# Patient Record
(demographics unavailable — no encounter records)

---

## 2017-10-06 NOTE — MRI
MRI CERVICAL SPINE WITHOUT CONTRAST:

 

HISTORY:

Neck pain with pain radiating down both arms and hands.  Cervical radiculopathy.

 

COMPARISON:

None.

 

TECHNIQUE:

A cervical spine MRI was performed without contrast administration.  Multisequential, multiplanar im
aging was performed.

 

FINDINGS:

Appropriate T1 marrow signal intensity of the cervical vertebrae.  Cervical spine vertebral body hei
ght is maintained.  No fracture.

 

No significant STIR hyperintensity to suggest vertebral body edema or ligamentous injury.

 

The visualized brain parenchyma, cervical-medullary junction, cervical cord, and upper thoracic cord
 have normal size and signal intensity.

 

C2-C3:  No significant disk osteophyte complex.  No significant central canal stenosis.  The neural 
foramina are patent.

 

C3-C4:  No significant disk osteophyte complex.  No significant central canal stenosis.  Degenerativ
e change in the bilateral uncovertebral joints results in mild bilateral foraminal narrowing.

 

C4-C5:  Broad-based disk osteophyte complex abuts the thecal sac.  No significant central canal sten
osis.  Degenerative changes in the bilateral uncovertebral joints results in moderate right and mild
 to moderate left foraminal narrowing.

 

C5-C6:  Broad-based disk osteophyte complex abuts the thecal sac.  No high grade central canal steno
sis.  Degenerative changes of the bilateral uncovertebral joint results in moderate right and mild l
eft foraminal narrowing.

 

C6-C7:  There is a broad-based disk osteophyte complex without significant central canal stenosis.  
Degenerative changes of the right uncovertebral joint result in moderate right foraminal narrowing. 
 Mild left foraminal narrowing.

 

C7-T1:  No significant disk osteophyte complex.  No significant central canal stenosis.  The neural 
foramina are patent.

 

IMPRESSION:

Degenerative changes in the cervical spine as above.

 

POS: [facility]

## 2017-10-09 NOTE — BD
BONE DENSITOMETRY USING DEXA:

 

Date:  10/09/17 

 

HISTORY:  

Postmenopausal screening for osteoporosis. 

 

FINDINGS:

 

Lumbar Spine:       BMD (g/cm2)

    L1              0.967         T-Score:  -0.2   Z-Score:  1.8

    L2              1.245         T-Score:  2.0    Z-Score:  4.2

    L3              1.194         T-Score:  1.0    Z-Score:  3.4 

    L4              1.246         T-Score:  1.7    Z-Score:  4.1

    L1-L4           1.167         T-Score:  1.1    Z-Score:  3.4

 

Femoral Neck:       0.828         T-Score:  -0.2   Z-Score:  1.8

Total Femur:        0.894         T-Score:  -0.4   Z-Score:  1.3

 

IMPRESSION: 

Normal bone mineral density. No evidence of osteopenia/osteoporosis.   

 

POS: [facility]

## 2017-12-19 NOTE — RAD
CERVICAL SPINE THREE VIEWS:

 

HISTORY:

Surgery in November 2017.

 

COMPARISON:

MRI from 10/05/2017.

 

FINDINGS:

The patient is status post ACDF at C4-C6.  There are diskectomy changes with early osseous incorporat
ion.  No displacement of the disk cages.

 

Moderate facet arthrosis at C3-C4 and C4-C5.  No listhesis.  Moderate degenerative disk space disease
 and sclerosis with osteophyte formation at C6-C7.

 

There are surgical clips in the left neck.

 

IMPRESSION:

Satisfactory appearance of anterior cervical diskectomy and fusion at C4-C6 without hardware complica
tion.

 

POS: [facility]

## 2018-02-27 NOTE — MRI
MRI LUMBAR SPINE WITH AND WITHOUT IV CONTRAST:

 

02/27/2018

 

HISTORY:

Lumbar radiculopathy.

 

The patient states right leg and back pain for a few months.  History of back surgery in September.

 

COMPARISON:

Noncontrast MRI lumbar spine on 08/17/2017.

 

FINDINGS:

There is an increased T2-weighted signal intensity structure seen at the anterior aspect, inferior po
le, right kidney, demonstrating characteristics most consistent with a cyst.  This was also present o
n the prior MRI examination, as well as CT abdomen on 07/17/2015.

 

There is partial visualization of intrahepatic and extrahepatic biliary ductal dilatation; however, C
T abdomen on 07/17/2015 demonstrated post cholecystectomy changes with biliary ductal dilatation on t
hat exam.  Findings were likely related to reservoir effect.  There is question of a tiny filling def
ect within a dilated posterior segment right hepatic duct, which could potentially represent a tiny c
alculus within the duct, although it is difficult to adequately evaluate on this exam.

 

The conus medullaris is normal in appearance and terminates at the level of the L1 vertebral body.

 

There is a rounded focus of increased T1 and T2 weighted signal intensity present in the T11 vertebra
l body, most compatible with a hemangioma.  Endplate degenerative changes are seen at multiple levels
 with degenerative changes again seen throughout the lumbar spine.  There is right convex scoliosis o
f the lumbar spine.  Tarlov cysts are again seen, posterior to the S2 and S3 vertebral bodies.

 

Again noted is mild aneurysmal dilatation of the infrarenal abdominal aorta, which measures 3.4 cm in
 greatest transverse dimension.  This was also present on the CT exam in 2015.

 

L1-L2:  There is mild broad-based disk bulge, resulting in very slight effacement of the ventral aspe
ct of the thecal sac.  The neural foramina are patent.

 

L2-L3:  There is loss of intervertebral disk height.  A broad-based disk osteophyte complex is again 
present.  There is mild left-sided neural foraminal narrowing with only minimal encroachment of the r
ight neural foramen, which is a stable finding.

 

L3-L4:  There is loss of intervertebral disk height.  There is a broad-based disk osteophyte complex 
with moderate facet hypertrophic changes and ligamentous thickening.  There is moderate narrowing of 
the central spinal canal with mild to moderate left-sided neural foraminal narrowing and mild right-s
ided neural foraminal narrowing.

 

L4-L5:  Again noted is a broad-based disk osteophyte complex and facet hypertrophic changes.  Promine
nt endplate degenerative changes are present at this level.  There is mild left-sided neural foramina
l narrowing with moderate to severe right-sided neural foraminal narrowing, but the degree of foramin
al narrowing is similar to the prior study.

 

L5-S1:  There is loss of intervertebral disk height with endplate degenerative changes.  There is a b
road-based disk osteophyte complex and facet degenerative changes.  A small central disk protrusion i
s present.  This central disk protrusion does result in mild mass effect on the central aspect of the
 ventral portion of the thecal sac.  Moderate bilateral neural foraminal narrowing is present, greate
r on the left.

 

There is a right laminotomy defect at L5 with enhancement in this region, suggesting scar tissue seen
 posterior to the right lamina and along the spinous process on the right; however, this enhancement 
does not extend into the central canal, and this area of enhancement is most suggestive of scarring. 
 Similar enhancement is seen at the L5-S1 level, again related to scarring in the soft tissues poster
ior to the L5 vertebral body.

 

IMPRESSION:

1.  Multilevel degenerative changes, which have not progressed when compared to the prior exam in 201
7.

 

2.  Post surgical changes at the L4-L5 level, related to laminotomy defect with enhancement in the so
ft tissues posterior to the vertebral body on the right, related to scarring.

 

3.  Right convex scoliosis of the lumbar spine.

 

4.  Infrarenal abdominal aortic aneurysm, seen on prior CT exam in 2017.

 

5.  Right renal cyst.

 

POS: [facility]

## 2018-03-01 NOTE — CT
CT LUMBAR SPINE WITHOUT CONTRAST:

 

Date:  03/01/18 

 

HISTORY:  

Lumbar radiculopathy. Low back pain with fall from kitchen cabinet 2 years ago. Previous surgery. 

 

COMPARISON:  

None. 

 

CORRELATION:

Lumbar spine MRI dated 02/27/18. 

 

TECHNIQUE:  

Lumbar spine CT is performed without contrast. Sagittal and coronal reformatted images are submitted 
for interpretation. 

 

FINDINGS:

Visualized lung bases are clear. No retroperitoneal mass, lymphadenopathy, or hematoma. Previous jak
atric surgical changes are suspected. Gallbladder is surgically absent. Nonobstructing calcifications
 in the right renal pelvis. Symmetric attenuation of the psoas muscles. No retroperitoneal mass, lymp
hadenopathy, or hematoma. There is atherosclerosis of an upper normal abdominal aorta, incompletely e
valuated. 

 

Vacuum disc phenomenon at L2-L3, L4-L5, and L5-S1. 

 

3.3 mm of anterolisthesis of L3 upon L4. 

 

Lumbar spine vertebral body height is maintained. No fracture. There are degenerative changes involvi
ng the posterior elements at L3, L4, and L5. 

 

Limited evaluation of the contents of the central spinal canal and neural foramina due to technique. 


 

Please refer to recent MRI for further detail. 

 

T11-T12 and T12-L1:  

No high grade central canal stenosis or high grade foraminal narrowing. 

 

L1-L2: 

No high grade central canal stenosis or high grade foraminal narrowing. 

 

L2-L3: 

Vacuum disc phenomenon. Generalized disc bulge. Mild central canal stenosis. Right neural foramen is 
patent. Mild left foraminal narrowing. 

 

L3-L4; 

Mild loss of disc space height. Generalized disc bulge, ligamentum flavum thickening, and facet hyper
trophy result in mild central canal stenosis. Right neural foramen is patent. Mild to moderate left f
oraminal narrowing. 

 

L4-L5: 

Vacuum disc phenomenon. Generalized disc bulge. No high grade central canal stenosis. Severe right an
d mild left foraminal narrowing. 

 

L5-S1: 

Disc desiccation with severe loss of disc space height. Generalized disc bulge without high grade macrina
tral canal stenosis. Moderate bilateral foraminal narrowing. 

 

There is hypodensity in the central aspect of the S1 level, compatible with a prominent thecal sac. 

 

IMPRESSION: 

Degenerative changes of the lumbar spine as above. Please refer to lumbar spine MRI for further detai
l. 

 

 

 

POS: [facility]

## 2018-04-13 NOTE — RAD
PORTABLE CHEST 1 VIEW:

 

DATE: 4/13/18.

TIME: 2:03 p.m.

 

HISTORY: 

Infiltrate on CT.

 

FINDINGS: 

The heart size is normal.  The lungs are expanded.  There are patchy infiltrates in the right mid and
 lower lung fields.  No pneumothoraces or pleural effusions are seen.

 

IMPRESSION: 

Findings are suspicious for right-sided pneumonia.

 

[facility]: [facility]

## 2018-04-13 NOTE — CT
CT OF HEAD NONCONTRAST:

 

Clinical history: Left sided facial droop and slurred speech. 

 

Comparison: 10-12-07 head CT 

 

FINDINGS: 

There is a focal region of encephalomalacia localizing to the posterior division of the left MCA terr
itory with surrounding white matter hypointensity indicating additional gliosis. No evidence of intra
cranial hemorrhage or mass effect or midline shift. Ventricular system is within normal limits of siz
e. 

 

IMPRESSION: 

1. Encephalomalacia of the left cerebral hemisphere. 

2. No acute intracranial lesion or mass effect. 

3. Telephone call with findings placed to ER physician, Dr. Dick, at 1313 hours 4-13-18. Code CR

 

POS: [facility]

## 2018-04-13 NOTE — PDOC.FPRHP
- History of Present Illness


Chief Complaint: Severe chills, Slurred Speech, Dizzy, Weak


History of Present Illness: 





This is a 74 yo female who comes in with complaints of waking up and having 

nonunderstandable speech, severe chills, dizziness, and weakness. Sister 

reports her looking more tired yesterday. Pt reports having cough for last 

month. Says had productive clear sputum. Reports that last few days having 

increased SOB with exertion. Did not think much of it, thought it was sinuses. 

Pt has pmh of Stroke in 2007 in which she has some R. sided defecits. Family 

says she does get slurred speech when she gets tired. yet, they stated the 

speech change today was much worse and different. Pt also has hx of CEA of left 

carotid and having stenosis in R. carotid but no severe enough for surgery. She 

denied any fevers. Pt reports having n/v/d/c but that it is chronic and related 

to multiple abdominal surgeries and chron's dz. Denied any chest pain. Denied 

any nasal congestion. Reports being week. 


ED Course: 





2 boluses fluid





- Allergies/Adverse Reactions


 Allergies











Allergy/AdvReac Type Severity Reaction Status Date / Time


 


metoclopramide [From Reglan] Allergy Intermediate "MAKES HER Verified 09/11/17 

14:25





   HYPER"  


 


metronidazole [From Flagyl] Allergy Intermediate DIZZY, Verified 09/11/17 14:25





   LIGHTHEADED  


 


Penicillins Allergy Intermediate Rash Verified 09/11/17 14:25


 


pregabalin [From Lyrica] Allergy Intermediate MOUTH Verified 09/11/17 14:25





   SWELLING  














- Home Medications


 











 Medication  Instructions  Recorded  Confirmed  Type


 


Aspirin [Aspir-Low] 81 mg PO DAILY 09/11/17 04/13/18 History


 


Calcium Carbonate [Calcium] 1,500 mg PO DAILY 09/11/17 04/13/18 History


 


Dicyclomine [Bentyl] 20 mg PO QID 09/11/17 04/13/18 History


 


Diphenoxylate HCl/Atropine 2 tab PO QID PRN 09/11/17 04/13/18 History





[Lomotil]    


 


Estradiol 1 mg PO DAILY 09/11/17 04/13/18 History


 


Hyoscyamine Sulfate 0.125 mg PO Q4H PRN 09/11/17 04/13/18 History


 


Ipratropium 0.06% Nasal Inhale 2 spray INH TID 09/11/17 04/13/18 History





[Atrovent 0.06% Nasal Spray]    


 


Levothyroxine Sodium 50 mcg PO DAILY 09/11/17 04/13/18 History


 


Loperamide HCl [Imodium A-D] 2 mg PO PRN PRN 09/11/17 04/13/18 History


 


Metoprolol Tartrate [Metoprolol 12.5 mg PO BID 09/11/17 04/13/18 History





Tartrate]    


 


Mirtazapine [Remeron] 15 mg PO HS 09/11/17 04/13/18 History


 


Multivitamin [Daily Multiple 1 each PO DAILY 09/11/17 04/13/18 History





Vitamin]    


 


Omeprazole [Omeprazole] 40 mg PO DAILY 09/11/17 04/13/18 History


 


Ramipril [Altace] 5 mg PO HS 09/11/17 04/13/18 History


 


Rosuvastatin Calcium [Rosuvastatin 20 mg PO HS 09/11/17 04/13/18 History





Calcium]    


 


Sertraline HCl [Zoloft] 100 mg PO DAILY 09/11/17 04/13/18 History


 


Tramadol HCl [Tramadol HCl] 50 mg PO BID PRN 09/11/17 04/13/18 History


 


azaTHIOprine [Azathioprine] 50 mg PO DAILY 09/11/17 04/13/18 History














- History


PMHx:Chron's Dz, Aneurysm in Stomach, HA 2004, Stroke 2007 w/ R. sided weakness

, Macrocytic Anemia of unknown cause, Depression


 


PSHx: 1/3 stomach removed (90s), Large Bowel Resection 2011 due to adhesions, 

CEA of Left carotid, Back Surgery 2017, Neck Surgery 2017, Cholecystectomy, 

Hysterectomy, 





FHx: Insignificant


 


Social: Smoked 50+ years, quit 6 yrs ago. No alcohol, No illicit drug use


 








- Review of Systems


General: reports: fever/chills, fatigue.  denies: weight/appetite/sleep changes

, night sweats


Eyes: denies: eye pain, vision changes, other


ENT: denies: nasal congestion, rhinorrhea, other


Respiratory: reports: cough, shortness of breath.  denies: exercise intolerance

, other


Cardiovascular: denies: chest pain, palpitation, edema, paroxysmal nocturnal 

dyspnea, orthopnea


Gastrointestinal: reports: nausea, vomiting, diarrhea, constipation, abdominal 

pain.  denies: GI bleeding


Genitourinary: denies: incontinence, dysuria, polyuria, discharge


Skin: denies: rashes, lesions, jaundice, itching


Musculoskeletal: denies: pain, tenderness, stiffness, swelling, arthritis/

arthralgias, other


Neurological: denies: numbness, syncope, seizure, weakness, other


Psychological: reports: depression.  denies: anxiety, other





- Vital signs


T 97.7, P 80, RR 17, O2 96% on 2L. BP 92/50





- Physical Exam


Constitutional: NAD, awake, alert and oriented, well developed


HEENT: normocephalic and atraumatic, PERRLA, EOMI, conjunctiva clear, MMM


Neck: supple, trachea midline, no LAD, no JVD, no thyromegaly, no bruits


Chest: no-tender to palpation, no lesions


Heart: RRR, normal S1/S2, no murmurs/rubs/gallops, pulses present, no edema


-Lungs: 





Mild wheezing noted. Decreased breath sounds noted on R. side. R. side with 

crackles


Abdomen: soft, non-tender, bowel sounds present, no masses/distention


Neurological: normal sensation


-Neurological: 





CN 7 affected. L. side facial droop noted. Some slow speech


Skin: no rash/lesions, good turgor, capillary refill <2 seconds


Heme/Lymphatic: no unusual bruising or bleeding


Psychiatric: normal mood and affect, good judgment and insight, intact recent 

and remote memory





[facility] H&P: Results





- Labs


Result Diagrams: 


 04/13/18 13:36





 04/13/18 13:36


Lab results: 


 











WBC  14.8 thou/uL (4.8-10.8)  H  04/13/18  13:36    


 


Hgb  10.1 g/dL (12.0-16.0)  L  04/13/18  13:36    


 


Hct  31.4 % (36.0-47.0)  L  04/13/18  13:36    


 


MCV  106.0 fl (81.0-99.0)  H  04/13/18  13:36    


 


Plt Count  181 thou/uL (130-400)   04/13/18  13:36    


 


Neutrophils %  90.0 % (42.0-75.0)  H  04/13/18  13:36    


 


Sodium  137 mmol/L (136-145)   04/13/18  13:36    


 


Potassium  3.3 mmol/L (3.5-5.1)  L  04/13/18  13:36    


 


Chloride  107 mmol/L ()   04/13/18  13:36    


 


Carbon Dioxide  21 mmol/L (23-31)  L  04/13/18  13:36    


 


BUN  16 mg/dL (9.8-20.1)   04/13/18  13:36    


 


Creatinine  1.21 mg/dL (0.6-1.1)  H  04/13/18  13:36    


 


Glucose  172 mg/dL ()  H  04/13/18  13:36    


 


Lactic Acid  3.1 mmol/L (0.5-2.2)  H  04/13/18  13:36    


 


Calcium  8.4 mg/dL (7.8-10.44)   04/13/18  13:36    


 


Total Bilirubin  0.3 mg/dL (0.2-1.2)   04/13/18  13:36    


 


AST  18 U/L (5-34)   04/13/18  13:36    


 


ALT  7 U/L (8-55)  L  04/13/18  13:36    


 


Alkaline Phosphatase  70 U/L ()   04/13/18  13:36    


 


Creatine Kinase  44 U/L ()   04/13/18  13:36    


 


CK-MB (CK-2)  0.6 ng/mL (0-6.6)   04/13/18  13:36    


 


Serum Total Protein  5.3 g/dL (6.0-8.3)  L  04/13/18  13:36    


 


Albumin  3.0 g/dL (3.4-4.8)  L  04/13/18  13:36    














- Radiology Interpretation


  ** Chest x-ray


Status: image reviewed by me, report reviewed by me (cxray concerning for r. 

side pna)





  ** CT scan - head


Status: image reviewed by me, report reviewed by me (CT head neg, old stroke 

findings. CTA head neck shows no acute blockage or change)





[facility] H&P: A/P





- Problem List


(1) Sepsis


Current Visit: Yes   Status: Acute   Code(s): A41.9 - SEPSIS, UNSPECIFIED 

ORGANISM   





(2) Community acquired pneumonia


Current Visit: Yes   Status: Acute   Code(s): J18.9 - PNEUMONIA, UNSPECIFIED 

ORGANISM   





(3) History of tobacco use


Current Visit: Yes   Status: Acute   Code(s): Z87.891 - PERSONAL HISTORY OF 

NICOTINE DEPENDENCE   





(4) Acute Crohn's disease


Current Visit: Yes   Status: Acute   Code(s): K50.90 - CROHN'S DISEASE, 

UNSPECIFIED, WITHOUT COMPLICATIONS   





(5) History of CVA with residual deficit


Current Visit: Yes   Status: Acute   Code(s): I69.30 - UNSPECIFIED SEQUELAE OF 

CEREBRAL INFARCTION   





(6) Macrocytic anemia


Current Visit: Yes   Status: Acute   Code(s): D53.9 - NUTRITIONAL ANEMIA, 

UNSPECIFIED   





- Plan





1. sepsis 2/2 CAP-no hx of recent hospitalization, coming from home, 


Azithro and Rocephin. Urine Strep/Legionella pending. will adjust tx accordinlgy


Received 2 boluses in ER. NS@150 ml/hr. Will continue to monitor HR 


Will follow lactic acid for sepsis


CBC am


-Cxray shows concern for R. side infectious process








2. Possible CVA vs. TIA-L. sided facial droop with speech defecit reported by 

family


 Will obtain MRI in AM given high risk with previous known CAD and hx of CVA. 


-consult neurology


-ON ASA and statin- continue


 for now allow for permissive htn to 220/120. 


 check FLP, A1c, TSH





3. Chrons-not in exac, will continue home meds





4. Hypothyroidism-continue home synthroid





5. Depression-continue home meds





6. CAD-continue statin, asa





7. Hld-continue statin





8. Htn- hold for now given above





[facility] H&P: Upper Level





- Pertinent history





Pt is a pleasant 74 yo F w/PMH of Chron's, Hx of MI, CVA who presents to the ED 

with 1 mo hx of cough and 1 day hx of L sided facial droop. Here is ED with 

sister and niece, they report that she has been very weak and hx of CVA with R 

sided deficit but noticed L facial droop and were worried about a stroke so 

brought her in. Reports wet cough that has been worsening, subjective chills, 

Tmax of 101 in ED. Denies PND, orthopnea, reports some diffuse mild abdominal 

pain on exam but non-tender. CXR in ED shows R sided consolidation. Brain CT 

and CTA head/neck negative. Hx of L carotid endarterectomy. 





- Pertinent findings


 Tmax: 101


General: Appears in no distress, tired, coughing, appropriate hygiene and 

approx stated age


Neck: L>R bruit, but no palpable thrill


Cardiac: RRR


Lungs: R base-rales, rhonchi


Abdomen: soft, nontender, good bs


Extremities: no cyanosis, clubbing, edema


Neuro: CN II-XII intact, no cerebellar findings with testing





- Plan


Date/Time: 04/13/18 1556








I, Sarahdesmond Menendez, have evaluated this patient and agree with findings/plan as 

outlined by intern resident. Pertinent changes/additions are listed here.





Pertinent A/P:


1. sepsis 2/2 CAP-no hx of recent hospitalization, coming from home, will 

continue treatment from ED of sarah and rocephin, will administer fluids for 

sepsis and recheck lactate in 3-4 hrs. Morning labs to recheck WBC





2. Possible CVA vs. TIA-do not appreciate any abnormalities on neuro exam-

questionable L bottom labial droop. Will obtain MRI in AM given high risk with 

previous known CAD and hx of CVA. Will consult neuro if CVA is evident, for now 

allow for permissive htn to 220/120. Risk stratify check FLP, A1c, TSH





3. Chrons-not in exac, will continue home meds





4. Hypothyroidism-continue home synthroid





5. Depression-continue home meds





6. CAD-continue statin, asa





7. Hld-continue statin





8. Htn- hold for now given above

## 2018-04-13 NOTE — CT
CTA HEAD WITH 3D VOLUME RENDERING

CTA NECK WITH 3D VOLUME RENDERING:

 

CLINICAL HISTORY: 

Stroke, left side facial droop, slurred speech.

 

FINDINGS: 

CT imaging of the head and neck performed.  There is atherosclerosis at the imaged aortic arch and in
volving the origins of the great vessels.  No high-grade stenosis or occlusion of the great vessel or
igins is seen.  There is mild atherosclerotic stenosis of the proximal aspect of the left subclavian 
artery by soft plaque.  The bilateral vertebral arteries reveal no high-grade stenosis or occlusion w
ith convergence into a patent basilar artery.  There is mild calcification at the origin of the right
 common carotid artery and there is calcified and noncalcified plaque suggesting mild stenosis at the
 distal right common carotid artery.  The origin of the left common carotid artery is not visualized.
  The imaged left common carotid artery reveals no high-grade stenosis or occlusion.  Evaluation of e
ach cervical ICA reveals no significant stenosis or occlusion.  There is atherosclerosis/plaque forma
tion at each carotid bulb.  There is calcification at each carotid siphon.  Correlate for history of 
prior left carotid endarterectomy. The bilateral M1 segments are patent.  Relative symmetric contrast
 opacification of distal MCA branches is seen bilaterally.  There is no high-grade stenosis of the bi
lateral ANDRES or PCA.  

 

Incidental note of multifocal patchy opacification of the right upper lung zone indicating infectious
/inflammatory process, although incompletely evaluated.  Correlate clinically.  Incidental note of an
terior metallic fusion of the cervical spine.

 

IMPRESSION: 

1.  No high-grade focal stenosis or thrombotic occlusion of the major arterial system head and neck. 
 Scattered atherosclerosis is present.

 

2.  Incidental note of multifocal patchy consolidation of the imaged upper right lung zone favoring i
nfectious/inflammatory process.  Correlate clinically.

 

POS: [facility]

## 2018-04-14 NOTE — MRI
NONCONTRAST ENHANCED MRI IMAGES OF BRAIN:

 

HISTORY: 

Stroke.

 

FINDINGS:

Noncontrast-enhanced MRI images of the brain obtained.

 

Images demonstrate encephalomalacia and gliotic changes seen in the left parietal lobe which appears 
to be old. 

 

No evidence of areas of diffusion restriction seen to suggest acute strokes.

 

Normal flow voids seen in the major intracranial vessels.  No significant evidence of intracranial pa
thology noted otherwise.

 

IMPRESSION: 

Old left parietal area of stroke with encephalomalacia and gliotic changes.  No acute intracranial pa
thology is seen. 

 

POS: [facility]

## 2018-04-14 NOTE — PDOC.FM
- Subjective


Subjective: 





Kerrie Morrissey seen at bedside this morning. She states that she is doing much 

better today compared to yesterday.  States that she felt terrible yesterday 

morning with chills and weakness. States that her slurred speech has improved 

and it is common for her to get slurred speech when she is weak or ill.  She 

denies any acute events overnight, denies fever, chills, chest pain, dyspnea, n/

v.





- Objective


MAR Reviewed: Yes


Vital Signs & Weight: 


 Vital Signs (12 hours)











  Temp Pulse Resp BP Pulse Ox


 


 04/14/18 03:44  97.6 F  76  16  99/64  91 L


 


 04/13/18 23:37  97.5 F L  72  16  112/56 L  94 L


 


 04/13/18 21:26      97


 


 04/13/18 21:18  97.5 F L  72  16   97


 


 04/13/18 19:18  97.6 F  74  18  99/43 L  89 L








 Weight











Weight                         55.61 kg














I&O: 


 











 04/12/18 04/13/18 04/14/18





 06:59 06:59 06:59


 


Intake Total   2697


 


Output Total   400


 


Balance   2297











Result Diagrams: 


 04/14/18 04:36





 04/14/18 04:36





<Juanjo Frazier - Last Filed: 04/14/18 07:53>





- Objective


Vital Signs & Weight: 


 Vital Signs (12 hours)











  Temp Pulse Pulse Pulse Resp BP BP


 


 04/14/18 16:00  98.5 F  62    14  


 


 04/14/18 11:30  98.0 F  79    20  


 


 04/14/18 10:46    84  83   124/65  139/75


 


 04/14/18 08:31  98.4 F  75    16  


 


 04/14/18 07:26  98.4 F  75    16  














  BP Pulse Ox


 


 04/14/18 16:00  137/74  95


 


 04/14/18 11:30  123/59 L  93 L


 


 04/14/18 10:46  


 


 04/14/18 08:31   90 L


 


 04/14/18 07:26  122/61  90 L








 Weight











Admit Weight                   54.068 kg


 


Weight                         55.61 kg














I&O: 


 











 04/13/18 04/14/18 04/15/18





 06:59 06:59 06:59


 


Intake Total  2697 


 


Output Total  400 


 


Balance  2297 











Result Diagrams: 


 04/14/18 04:36





 04/14/18 04:36





<Mitul Montgomery - Last Filed: 04/14/18 16:59>





Phys Exam





- Physical Examination


Constitutional: NAD


HEENT: moist MMs, sclera anicteric


Neck: no nodes, supple, full ROM


Respiratory: no wheezing, no rales, no rhonchi, clear to auscultation bilateral


Cardiovascular: RRR, no significant murmur


Gastrointestinal: soft, non-tender, no distention


Musculoskeletal: no edema, pulses present


Neurological: non-focal, normal sensation, moves all 4 limbs


Psychiatric: normal affect, A&O x 3


Skin: cap refill <2 seconds





<Juanjo Frazier - Last Filed: 04/14/18 07:53>





Dx/Plan


(1) Sepsis


Code(s): A41.9 - SEPSIS, UNSPECIFIED ORGANISM   Status: Resolved   





(2) Community acquired pneumonia


Code(s): J18.9 - PNEUMONIA, UNSPECIFIED ORGANISM   Status: Acute   


  QualifierTitle:    Laterality: right 





(3) History of CVA with residual deficit


Code(s): I69.30 - UNSPECIFIED SEQUELAE OF CEREBRAL INFARCTION   Status: Acute   





(4) History of Crohn's disease


Code(s): Z87.19 - PERSONAL HISTORY OF OTHER DISEASES OF THE DIGESTIVE SYSTEM   

Status: Acute   





- Plan


Plan: 





1. sepsis 2/2 CAP-no hx of recent hospitalization, coming from home


- Azithro and Rocephin. Urine Strep/Legionella were negative.


- Received 2 boluses in ER. NS@150 ml/hr. Will continue to monitor HR 


- Lactic Acid downtrending 4.3 to 2.5


- WBC rising to 17.9 this morning with 85% PMNs


- Cxray shows concern for R. side infectious process








2. Possible CVA vs. TIA-L. sided facial droop with speech defecit reported by 

family


- Will obtain MRI in AM given high risk with previous known CAD and hx of CVA. 


- consult neurology if MRI positive


- ON ASA and statin- continue


- for now allow for permissive htn to 220/120. 





3. Chrons-not in exac, will continue home meds





4. Hypothyroidism-continue home synthroid





5. Depression-continue home meds





6. CAD-continue statin, asa





7. Hld-continue statin





8. Htn- hold for now given above








<Juanjo Frazier - Last Filed: 04/14/18 07:53>





Attending Addendum





- Attending Addendum


Date/Time: 04/14/18 1612





I personally evaluated the patient and discussed the management with Dr. Frazier.


I agree with the History, Examination, Assessment and Plan documented above 

with any addition or exceptions noted below.


Ms. Morrissey is a 72 y/o female with a PMHx of Left MCA stroke who was admitted 

for malaise and fatigue with increased right sided weakness, facial droop and 

slurred speech. She was found to have a RLL community acquired pneumonia and 

Dr. Sánchez (neurology) evaluated her today and he believes her neuro sx's are 

result of toxic-metabolic encephalpathy due to sepsis from her CAP.  She feels 

much better this a.m. and believes from a neuro standpoint she is back at 

baseline. 


Exam:  T97.6, P76, R16, BP 99/64,  SaO2 91, Wt 55.6 kg


HEENT: WNL. mm moist. no appreciable droop to this examiner. 


Neck: supple no adenopathy. 


Lungs; CTA with decreased breath sounds R posterior base. 


Abd: Neg.


Ext: No C/E/C/pallor.


MRI Brain shows only prior stroke with encephalomalacia. No acute changes.


A:  Sepsis due to CAP improved. Toxic encephalopathy resolved.  


P: Continue Rocephin and Azithromycin.  Possibly home tomorrow if continued 

improvement.  


MD Nii





<Mitul Montgomery - Last Filed: 04/14/18 16:59>

## 2018-04-14 NOTE — CON
DATE OF CONSULTATION:  04/14/2018

 

REFERRING PROVIDER:  Domingo Brown M.D.

 

REASON FOR CONSULTATION:  Worsening right-sided weakness.

 

HISTORY OF PRESENT ILLNESS:  Ms. Morrissey is a pleasant 73-year-old  female who has been cons
ulted for evaluation of worsening right-sided weakness.  Patient reports that over the past few days,
 she has been feeling more fatigued and tired.  She has noted increasing episodes of weakness all ove
r, but more so on the right side than the left.  She has also had some chills and chest congestion.  
She notes that yesterday she had worsening weakness in her right side.  She was also having increasin
g difficulty with getting her words out and expressing herself.  This prompted her to present to the 
[facility].  She does report that she has a history of stroke in 2007, which resulted 
in right-sided weakness and aphasia.  She had recovered from her stroke and has been doing well up un
til yesterday.  She mentions that she has been having increasing episodes of pain in her right lower 
extremity when she is walking or lying down.  She also has had numbness that tends to come on when sh
e sits for short duration.  She has had multiple falls as she is not able to control her right leg at
 times.  She has had cervical and lumbar spine surgery done.  Her lumbar spine surgery was done in 09
/2017 and then followed by a cervical spine surgery in 11/2017.  As she continued to have these episo
senia of numbness and pain along with weakness and incoordination, she was supposed to be seen by me in
 clinic in May.

 

PAST MEDICAL HISTORY:  Significant for hypertension, history of stroke with left MCA stroke, macrocyt
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

 

PAST SURGICAL HISTORY:  Significant for intestinal surgery, large bowel resection in 2011 due to adhe
sions, left carotid endarterectomy, lumbar spine surgery in 09/2017, cervical spine surgery in 11/201
7, cholecystectomy, and hysterectomy.

 

FAMILY HISTORY:  None significant.

 

SOCIAL HISTORY:  She is a smoker of 50+ years, but quit 6 years ago.  She denies alcohol use or illic
it drug use.

 

CURRENT MEDICATIONS:  Please review MAR.

 

ALLERGIES:  Include METOCLOPRAMIDE, METRONIDAZOLE, PENICILLIN, PREGABALIN.

 

REVIEW OF SYSTEMS:  As mentioned above in the HPI, otherwise negative.

 

PHYSICAL EXAMINATION:

VITAL SIGNS:  Blood pressure of 123/59, pulse of 79, temperature of 98, respirations are 20, O2 sats 
of 93% on room air.

GENERAL:  Well-developed and well-nourished  female in no apparent distress.

RESPIRATORY:  Clear to auscultation bilaterally.

CARDIOVASCULAR:  Regular rate and rhythm.

NEUROLOGIC:  Mental status:  The patient is awake, alert, and oriented x3.  Speech and language:  Flu
ent speech.  Cranial nerves:  Pupils are 3 mm and reactive.  Visual fields are intact.  External musc
les are intact.  No nystagmus is noted.  Face is symmetric.  Tongue and uvula are midline.  Motor exa
m showed normal tone and bulk with 5/5 strength in both upper and lower extremities.  Babinski:  Plan
tar responses flexion bilaterally.  Sensory:  Diminished sensation in both lower extremities in dista
l proximal gradient.  Deep tendon reflexes; 2+ reflexes in both upper extremities and 1+ reflex in philip
th the lower extremities.  Romberg is positive.  Gait is somewhat wide based gait.

 

LABORATORY DATA:  Reviewed, which included CBC, coag panel, CMP, lipid profile, vitamin B12, TSH, CK,
 CK-MB, CPK, troponin which is significant for white cell count of 17.9, hemoglobin 9.3, hematocrit 2
7.9, MCV of 105, PT of 15.8, INR 1.2, PTT of 25.0, otherwise unremarkable.

 

IMAGING STUDIES:  MRI brain without contrast was reviewed, which showed no acute intracranial abnorma
lity.  CT angiogram of the head and neck were reviewed, which showed no acute intracranial or extracr
anial vascular abnormality.  Chest x-ray results were reviewed, which showed right-sided pneumonia.

 

IMPRESSION:

1.  Right lower lobe pneumonia.

2.  Worsening right-sided weakness and aphasia, likely metabolic encephalopathy secondary to #1.

3.  Gait imbalance.

4.  Right lower extremity numbness and weakness resulting in gait abnormality.

 

Ms. Morrissey is a 73-year-old -American female who presented with the acute worsening of right-
sided weakness and aphasia.  She was found to have right lower lobe pneumonia.  Her MRI brain has bee
n negative for acute intracranial pathology.  Her symptoms are likely secondary to toxic metabolic ef
fect from pneumonia.  She continued on current medical management.  She has noted right lower extremi
ty weakness and numbness and difficulty with gait imbalance with frequent falls that is of unknown et
iology.  I will see her in my clinic for EMG nerve conduction study of right lower extremity for furt
her evaluation.  At this time, no further neurological workup needed from my standpoint.

 

Thank you for consultation.

## 2018-04-15 NOTE — PDOC.FM
- Subjective


Subjective: 





Kerrie Morrissey seen at bedside this morning. Doing well, patient asked if she 

could go home today stating she feels well enough to be at home.  She denies 

any chest pain, dyspnea, fever, chills, n/v. 





- Objective


MAR Reviewed: Yes


Vital Signs & Weight: 


 Vital Signs (12 hours)











  Temp Pulse Resp BP Pulse Ox


 


 04/15/18 03:43  99.0 F  114 H  16  155/77 H  93 L


 


 04/14/18 23:57  98.9 F  102 H  16  159/89 H  96


 


 04/14/18 20:20  98.9 F  102 H  16   94 L


 


 04/14/18 19:20  97.9 F  85  16  143/62 H  94 L








 Weight











Admit Weight                   54.068 kg


 


Weight                         55.61 kg














I&O: 


 











 04/13/18 04/14/18 04/15/18





 06:59 06:59 06:59


 


Intake Total  2697 


 


Output Total  400 


 


Balance  2297 











Result Diagrams: 


 04/15/18 04:19





 04/15/18 04:19





<Juanjo Frazier - Last Filed: 04/15/18 06:52>





- Objective


Vital Signs & Weight: 


 Vital Signs (12 hours)











  Temp Pulse Resp BP Pulse Ox


 


 04/15/18 08:38  99.5 F  97  18  


 


 04/15/18 07:59  99.5 F  97  18  141/74 H  96


 


 04/15/18 03:43  99.0 F  114 H  16  155/77 H  93 L








 Weight











Admit Weight                   54.068 kg


 


Weight                         55.61 kg














I&O: 


 











 04/14/18 04/15/18 04/16/18





 06:59 06:59 06:59


 


Intake Total 2697 480 


 


Output Total 400  


 


Balance 2297 480 











Result Diagrams: 


 04/15/18 04:19





 04/15/18 04:19





<Mitul Montgomery - Last Filed: 04/15/18 14:03>





Phys Exam





- Physical Examination


Constitutional: NAD


HEENT: moist MMs, sclera anicteric


Neck: no JVD, supple, full ROM


Respiratory: no wheezing, no rales, no rhonchi, clear to auscultation bilateral


Cardiovascular: RRR, no significant murmur


Gastrointestinal: soft, non-tender, no distention, positive bowel sounds


Musculoskeletal: no edema, pulses present


Neurological: non-focal, normal sensation, moves all 4 limbs


Psychiatric: normal affect, A&O x 3


Skin: normal turgor, cap refill <2 seconds





<Juanjo Frazier - Last Filed: 04/15/18 06:52>





Dx/Plan


(1) Sepsis


Code(s): A41.9 - SEPSIS, UNSPECIFIED ORGANISM   Status: Resolved   





(2) Community acquired pneumonia


Code(s): J18.9 - PNEUMONIA, UNSPECIFIED ORGANISM   Status: Acute   


  QualifierTitle:    Laterality: right 





(3) History of CVA with residual deficit


Code(s): I69.30 - UNSPECIFIED SEQUELAE OF CEREBRAL INFARCTION   Status: Acute   





(4) History of Crohn's disease


Code(s): Z87.19 - PERSONAL HISTORY OF OTHER DISEASES OF THE DIGESTIVE SYSTEM   

Status: Acute   





- Plan


Plan: 





1. sepsis 2/2 CAP-no hx of recent hospitalization, coming from home


- Azithro and Rocephin. Urine Strep/Legionella were negative.


- Received 2 boluses in ER. NS@150 ml/hr. Will continue to monitor HR 


- Lactic Acidosis resolved: 4.3 to 2.5 to 0.6


- WBC downtrending today


- Cxray shows concern for R. side infectious process


- Clinically, is improving, continue current management








2. Metabolic Encephalopathy-likely from #1


- MRI of brain negative for acute processes 


- Neurology consulted, appreciate recs


- Neurology wants patient to f/u OP for EMG nerve conduction study


- ON ASA and statin- continue 





3. Chrons-not in exac, will continue home meds





4. Hypothyroidism-continue home synthroid





5. Depression-continue home meds





6. CAD-continue statin, asa





7. Hld-continue statin





8. Htn- hold for now given above





<Juanjo Frazier - Last Filed: 04/15/18 06:52>





Attending Addendum





- Attending Addendum


Date/Time: 04/15/18 9698





I personally evaluated the patient and discussed the management with Dr. Frazier.


I agree with the History, Examination, Assessment and Plan documented above 

with any addition or exceptions noted below.


She feels well and is asymptomatic x for very slight non-productive cough.  

Lactic acidosis is resolved to 0.6. 


A:  Community acquired pneumonia, likely due to streptococcus pneumonia.


     Toxic/Metabolic encephalopathy now resolved likely due to sepsis from CAP, 

above. 


     R-LE weakness of unknown etiology.  


P:  D/C home to complete course of Azithromycin & Cefdinir. 


     Follow up with PCP, Dr. Casas, and with Dr. Sánchez for neurology further 

evaluation with EMG/NCV.


     MD Nii 





<Mitul Montgomery - Last Filed: 04/15/18 14:03>

## 2018-04-15 NOTE — DIS-2
DATE OF ADMISSION:  2018

 

DATE OF DISCHARGE:  04/15/2018

 

RESIDENT:  Juanjo Frazier M.D.

 

ADMITTING ATTENDING:  Nohemy Rodriguez M.D.

 

DISCHARGE ATTENDING:  Mitul Montgomery M.D.

 

CONSULTATIONS:

1.  Neurology, Dr. Sánchez, on 2018.

2.  Stroke Team, on 2018.

 

PRIMARY DIAGNOSIS:  Sepsis secondary to community-acquired pneumonia.

 

SECONDARY DIAGNOSES:

1.  Metabolic encephalopathy, likely secondary to sepsis from community-acquired pneumonia.

2.  Crohn disease.

3.  History of cerebrovascular accident.

4.  Hypothyroidism.

5.  Coronary artery disease.

6.  Hyperlipidemia.

7.  Hypertension.

8.  History of tobacco abuse.

 

DISCHARGE MEDICATIONS:  Resume home meds includin.  Ramipril 5 mg p.o. at bedtime.

2.  Levothyroxine 50 mcg p.o. daily.

3.  Aspirin 81 mg p.o. daily.

4.  Rosuvastatin 20 mg p.o. at bedtime.

5.  Metoprolol tartrate 12.5 mg p.o. b.i.d.

6.  Multivitamin 1 each day.

7.  Calcium carbonate 1500 mg p.o. daily.

8.  Mirtazapine 15 mg p.o. at bedtime.

9.  Hyoscyamine sulfate 0.125 mg p.o. q.4 hours p.r.n.

10.  Lomotil 2 tabs p.o. q.i.d. p.r.n.

11.  Bentyl 20 mg p.o. q.i.d.

12.  Zoloft 100 mg p.o. daily.

13.  Estradiol 1 mg p.o. daily.

14.  Loperamide HCL 2 mg p.o. p.r.n.

15.  Ipratropium 2 sprays INH t.i.d.

16.  Azathioprine 50 mg p.o. daily.

17.  Tramadol 50 mg p.o. b.i.d. p.r.n.

18.  Omeprazole 40 mg p.o. daily.

19.  Gabapentin 300 mg p.o. daily.

 

New home medications:

1.  Azithromycin 250 mg p.o. daily for 3 days.

2.  Cefdinir 300 mg p.o. q.12 hours.

 

HISTORY OF PRESENT ILLNESS AND HOSPITAL COURSE:  Ms. Kerrie Morrissey is a 73-year-old female with pas
t medical history of CVA with right-sided deficits, hypertension, hyperlipidemia, and history of CEA 
and the left carotid, who presented with a history of severe chills, slurred speech, dizziness and we
akness.  The patient states that when she woke up, the family was having difficulty understanding her
 and sister reports that she looked more tired than yesterday and her speech was more slurred than no
rmal.  The patient endorsed cough for the last month with productive clear sputum and states that for
 the last few days she has had increased shortness of breath with exertion.  Family says that she mason
s get slurred speech when she gets tired, but the speech change today was much worse than normal.  CT
 of the head at the time of admission showed old stroke findings, but no acute findings and no acute 
blockages in the neck.  CTA of the head and neck showed no high-grade focal stenosis or thrombotic oc
clusion of the major arterial system.  It did show incidental multifocal patchy consolidation of the 
right upper lung favoring infection.  Chest x-ray was done that showed right-sided pneumonia.  During
 her admission, she also had a brain MRI that showed old left parietal area of stroke with encephalom
alacia and gliotic changes.  No acute intracranial pathology seen.  During the patient's admission wi
thin the first 24 hours, the patient's symptoms improved dramatically.  On 2018, the patient st
ates that she was feeling much better and not having the fevers, chills, and weakness that she was ex
periencing before.  Her speech also cleared up and she was no longer slurring her speech at that time
.  Neurology was consulted; saw the patient and Dr. Sánchez had a higher suspicion for worsening right-s
ided weakness and aphasia, likely being secondary to the metabolic encephalopathy from the right lowe
r lobe pneumonia.  He also recommended her following up outpatient in the Neurology Clinic for EMG ne
rve conduction study of the right lower extremity for further evaluation.  He cleared her from a neur
ological standpoint at that time.  The patient was cleared from a medical standpoint on 04/15/2018.  
She was no longer requiring supplemental O2 and she was feeling much better and ready to complete the
 rest of her treatment outpatient.  The patient was in agreement with the plan.

 

DISPOSITION:  Stable.

 

DISCHARGE INSTRUCTIONS:

1.  Location:  Home.

2.  Diet:  Heart healthy.

3.  Activity:  As tolerated.

4.  Followup:  Follow up with Dr. Sánchez for EMG nerve conduction study and follow up with primary care
 provider for a routine hospital admission followup.

 

The patient is expecting to have complete recovery if she continues a course of antibiotics.

## 2018-04-17 NOTE — CT
CTA HEAD WITH 3D VOLUME RENDERING

CTA NECK WITH 3D VOLUME RENDERING:

 

CLINICAL HISTORY: 

Stroke, left side facial droop, slurred speech.

 

FINDINGS: 

CT imaging of the head and neck performed.  There is atherosclerosis at the imaged aortic arch and in
volving the origins of the great vessels.  No high-grade stenosis or occlusion of the great vessel or
igins is seen.  There is mild atherosclerotic stenosis of the proximal aspect of the left subclavian 
artery by soft plaque.  The bilateral vertebral arteries reveal no high-grade stenosis or occlusion w
ith convergence into a patent basilar artery.  There is mild calcification at the origin of the right
 common carotid artery and there is calcified and noncalcified plaque suggesting mild stenosis at the
 distal right common carotid artery.  The origin of the left common carotid artery is not visualized.
  The imaged left common carotid artery reveals no high-grade stenosis or occlusion.  Evaluation of e
ach cervical ICA reveals no significant stenosis or occlusion.  There is atherosclerosis/plaque forma
tion at each carotid bulb.  There is calcification at each carotid siphon.  Correlate for history of 
prior left carotid endarterectomy. The bilateral M1 segments are patent.  Relative symmetric contrast
 opacification of distal MCA branches is seen bilaterally.  There is no high-grade stenosis of the bi
lateral ANDRES or PCA.  

 

Incidental note of multifocal patchy opacification of the right upper lung zone indicating infectious
/inflammatory process, although incompletely evaluated.  Correlate clinically.  Incidental note of an
terior metallic fusion of the cervical spine.

 

IMPRESSION: 

1.  No high-grade focal stenosis or thrombotic occlusion of the major arterial system head and neck. 
 Scattered atherosclerosis is present.

 

2.  Incidental note of multifocal patchy consolidation of the imaged upper right lung zone favoring i
nfectious/inflammatory process.  Correlate clinically.

## 2018-07-06 NOTE — EKG
Test Reason : 

Blood Pressure : ***/*** mmHG

Vent. Rate : 050 BPM     Atrial Rate : 050 BPM

   P-R Int : 208 ms          QRS Dur : 088 ms

    QT Int : 444 ms       P-R-T Axes : 082 076 079 degrees

   QTc Int : 404 ms

 

Sinus bradycardia

Septal infarct (cited on or before 06-JUL-2018)

Abnormal ECG

When compared with ECG of 13-APR-2018 13:30,

Vent. rate has decreased BY  33 BPM

Confirmed by DR. NACHO SHANNON (3) on 7/6/2018 5:13:55 PM

 

Referred By:  DARCY           Confirmed By:DR. NACHO SHANNON

## 2018-07-12 NOTE — HP
HISTORY OF PRESENT ILLNESS:  Ms. Morrissey is a very pleasant 73-year-old woman who is known to us for 
previous evaluations of surgeries for both cervical and lumbar complaints.  She returns now with mitchel
re right-sided L4 symptoms and has an MRI from [facility] that reveals foraminal stenosis of signific
ant degree, with a very small interpedicular distance between L4-L5 on that side.  She has had nerve 
conduction study as well as CT scan that helped confirm this is the likely location of her symptoms. 
 She notes move forward with surgery at this time.

 

PAST MEDICAL HISTORY:  Anemia, hypertension.

 

PAST SURGICAL HISTORY:  Cholecystectomy, hysterectomy, gastric surgery, tonsillectomy, lumbar decompr
ession.

 

ALLERGIES:  PENICILLIN, CODEINE, FLAGYL, REGLAN and LYRICA.

 

CURRENT MEDICATIONS:  Altace, aspirin, azathioprine, Crestor, dicyclomine, estradiol, ipratropium bro
mide, levothyroxine, Lomotil, metoprolol, mirtazapine, Prilosec, tramadol and Zoloft.

 

PHYSICAL EXAMINATION:

NEUROLOGIC:  Patient is alert and oriented x3.  Gait is somewhat antalgic.  Lower extremity motor exa
m is normal.

 

ASSESSMENT:  Lumbar radiculopathy.

 

PLAN:  Dr. Self met with the patient, reviewed imaging and advocated for a right L4-L5 facetectomy w
ith unilateral instrumentation and fusion.  He explained to the patient the risks, benefits, and alte
rnatives to the procedure.  The patient expressed understanding and would like to move forward with s
urgery as discussed.  I do believe the patient is mentally competent and capable of making medical de
cisions for herself and we will move forward with surgery as planned.

## 2018-07-13 NOTE — OP
DATE OF PROCEDURE:  07/13/2018  

 

SURGEON:  Thad Self M.D.

 

FIRST ASSISTANT:  Ruben Schmid PA-C

 

INDICATION:  Pain.

 

DIAGNOSIS:  Lumbar radiculopathy.

 

PROCEDURE:  Right L4-5 facetectomy, posterolateral instrument infusion, placement of allograft, place
ment of autograft.

 

ANESTHESIA:  General.

 

TECHNIQUE:  The patient was brought into the operating room and placed under general anesthesia.  She
 was flipped from a supine to a prone position on the operating room table.  A linear incision was pl
anned over L4-5 on the right.  After prepping and draping and after an appropriate operative pause, t
he incision was created.  The soft tissues were swept right of midline.  A self-retaining retractor w
as placed.  The facet joint at L4-5 was removed in order to decompress the exiting L4 nerve root.  Th
e L4 and L5 pedicles were exposed.  Pedicle screws were placed with the aid of C-arm fluoroscopy.  A 
kaye was placed across the screw heads and final tightened under distraction to further decompress the
 exiting L4 nerve root.  Allograft and autograft material was then placed in the lateral confines of 
the instrumentation construct.  The wound was irrigated.  Hemostasis was maintained throughout.  The 
wound was then closed in anatomic layers and a pressure dressing was applied.  There were no known pr
ocedural complications.

## 2018-08-23 NOTE — RAD
TWO VIEWS LUMBAR SPINE:

8/23/18

 

INDICATION:

Lumbar radiculopathy.

 

COMPARISON:  

None.

 

FINDINGS:  

There is dextroscoliosis of the lumbar spine. there is right pedicle screws at L4 and L5 with an inte
rconnecting cable. There is slight anterior translation of L3 on L4 which is likely degenerative. The
re is slight retrolisthesis of L2 on L3. There is severe multilevel disc degenerative facet osteoarth
ritic change. Surgical clips seen right of midline at the L4 vertebral level. There is some surgical 
clips within the upper abdomen near the midline. Suture clips seen left of midline at L2-3. 

 

IMPRESSION:  

1.      Moderate to severe multilevel spondylosis cervical spine. 

2.      Postoperative changes consistent with L4-5 interbody fusion. 

 

POS: [facility]